# Patient Record
Sex: MALE | Race: WHITE | NOT HISPANIC OR LATINO | Employment: OTHER | URBAN - METROPOLITAN AREA
[De-identification: names, ages, dates, MRNs, and addresses within clinical notes are randomized per-mention and may not be internally consistent; named-entity substitution may affect disease eponyms.]

---

## 2021-05-29 ENCOUNTER — RECORDS - HEALTHEAST (OUTPATIENT)
Dept: ADMINISTRATIVE | Facility: CLINIC | Age: 81
End: 2021-05-29

## 2022-05-04 ENCOUNTER — HOSPITAL ENCOUNTER (EMERGENCY)
Facility: CLINIC | Age: 82
Discharge: LEFT BEFORE DISCHARGE | End: 2022-05-04
Admitting: EMERGENCY MEDICINE

## 2022-05-04 VITALS
WEIGHT: 155 LBS | TEMPERATURE: 96.3 F | OXYGEN SATURATION: 96 % | RESPIRATION RATE: 16 BRPM | SYSTOLIC BLOOD PRESSURE: 139 MMHG | DIASTOLIC BLOOD PRESSURE: 65 MMHG | HEART RATE: 62 BPM

## 2022-05-04 LAB
ATRIAL RATE - MUSE: 59 BPM
DIASTOLIC BLOOD PRESSURE - MUSE: NORMAL MMHG
INTERPRETATION ECG - MUSE: NORMAL
P AXIS - MUSE: 52 DEGREES
PR INTERVAL - MUSE: 162 MS
QRS DURATION - MUSE: 94 MS
QT - MUSE: 396 MS
QTC - MUSE: 392 MS
R AXIS - MUSE: 43 DEGREES
SYSTOLIC BLOOD PRESSURE - MUSE: NORMAL MMHG
T AXIS - MUSE: 55 DEGREES
VENTRICULAR RATE- MUSE: 59 BPM

## 2022-05-04 PROCEDURE — 93005 ELECTROCARDIOGRAM TRACING: CPT | Performed by: EMERGENCY MEDICINE

## 2022-05-04 PROCEDURE — 999N000104 HC STATISTIC NO CHARGE

## 2022-05-04 NOTE — ED TRIAGE NOTES
Patient is here with right jaw pain that does not radiate. He stated that he is unable to open his mouth. No hx of cardiac issues. No other symptoms at this time. He just finished an antibiotic.

## 2023-03-20 ENCOUNTER — APPOINTMENT (OUTPATIENT)
Dept: CT IMAGING | Facility: CLINIC | Age: 83
End: 2023-03-20
Attending: EMERGENCY MEDICINE
Payer: COMMERCIAL

## 2023-03-20 ENCOUNTER — HOSPITAL ENCOUNTER (EMERGENCY)
Facility: CLINIC | Age: 83
Discharge: HOME | End: 2023-03-21
Attending: EMERGENCY MEDICINE | Admitting: EMERGENCY MEDICINE
Payer: COMMERCIAL

## 2023-03-20 DIAGNOSIS — L29.9 ITCHING: ICD-10-CM

## 2023-03-20 DIAGNOSIS — R51.9 ACUTE NONINTRACTABLE HEADACHE, UNSPECIFIED HEADACHE TYPE: ICD-10-CM

## 2023-03-20 PROCEDURE — 250N000011 HC RX IP 250 OP 636: Performed by: EMERGENCY MEDICINE

## 2023-03-20 PROCEDURE — 70450 CT HEAD/BRAIN W/O DYE: CPT

## 2023-03-20 PROCEDURE — 96374 THER/PROPH/DIAG INJ IV PUSH: CPT

## 2023-03-20 PROCEDURE — 96375 TX/PRO/DX INJ NEW DRUG ADDON: CPT

## 2023-03-20 PROCEDURE — 99285 EMERGENCY DEPT VISIT HI MDM: CPT | Mod: 25

## 2023-03-20 PROCEDURE — 250N000013 HC RX MED GY IP 250 OP 250 PS 637: Performed by: EMERGENCY MEDICINE

## 2023-03-20 RX ORDER — IOPAMIDOL 755 MG/ML
100 INJECTION, SOLUTION INTRAVASCULAR ONCE
Status: DISCONTINUED | OUTPATIENT
Start: 2023-03-20 | End: 2023-03-20

## 2023-03-20 RX ORDER — DEXAMETHASONE SODIUM PHOSPHATE 10 MG/ML
10 INJECTION, SOLUTION INTRAMUSCULAR; INTRAVENOUS ONCE
Status: COMPLETED | OUTPATIENT
Start: 2023-03-20 | End: 2023-03-20

## 2023-03-20 RX ORDER — KETOROLAC TROMETHAMINE 15 MG/ML
15 INJECTION, SOLUTION INTRAMUSCULAR; INTRAVENOUS ONCE
Status: COMPLETED | OUTPATIENT
Start: 2023-03-20 | End: 2023-03-20

## 2023-03-20 RX ORDER — HYDROXYZINE HYDROCHLORIDE 25 MG/1
25 TABLET, FILM COATED ORAL EVERY 6 HOURS PRN
Status: DISCONTINUED | OUTPATIENT
Start: 2023-03-20 | End: 2023-03-21 | Stop reason: HOSPADM

## 2023-03-20 RX ADMIN — DEXAMETHASONE SODIUM PHOSPHATE 10 MG: 10 INJECTION, SOLUTION INTRAMUSCULAR; INTRAVENOUS at 22:49

## 2023-03-20 RX ADMIN — KETOROLAC TROMETHAMINE 15 MG: 15 INJECTION, SOLUTION INTRAMUSCULAR; INTRAVENOUS at 22:49

## 2023-03-20 RX ADMIN — HYDROXYZINE HYDROCHLORIDE 25 MG: 25 TABLET ORAL at 22:49

## 2023-03-21 VITALS
WEIGHT: 160 LBS | BODY MASS INDEX: 22.9 KG/M2 | RESPIRATION RATE: 16 BRPM | OXYGEN SATURATION: 96 % | HEART RATE: 58 BPM | TEMPERATURE: 98.7 F | SYSTOLIC BLOOD PRESSURE: 155 MMHG | HEIGHT: 70 IN | DIASTOLIC BLOOD PRESSURE: 67 MMHG

## 2023-03-21 NOTE — ED TRIAGE NOTES
Pt presents to the ED with c/o HA for the past 3 days along with intermittent nausea. Denies emesis or vision/ balance changes. Currently denies any HA. Pt has no pain at the time of triage. EMS gave 4mg ODT zofran.

## 2023-03-21 NOTE — DISCHARGE INSTRUCTIONS
You were seen in the Emergency Department today for a headache and itchiness.      You were given a dose of decadron to hopefully prevent a rebound headache.  The Vistaril should hopefully help with the itchiness and sleep.  The Toradol is similar to Motrin.    I would recommend you continue to use Tylenol and ibuprofen for your headaches.  We also talked about you increasing the dose of your prednisone to see if this might help in case some of your symptoms are related to withdrawal.  You should keep the appointment for an MRI later this week unless the headache goes away before then.      Please return to the ER if you experience worsening headache, numbness/tingling, weakness, inability to keep food/fluids down, and/or for any other new or concerning symptoms, otherwise please follow up with your primary doctor and the dermatologist for recheck.     Below is some information you might find useful.     Thank you for choosing [facility]. It was a pleasure taking care of you today!  - Dr. Susana AMBROCIO. You could also try getting a MyPillow to see if that helps you sleep! (Just kidding)   :)

## 2023-03-21 NOTE — ED PROVIDER NOTES
EMERGENCY DEPARTMENT ENCOUNTER      NAME: Sebastian Teague  YOB: 1940  MRN: [id number]    FINAL IMPRESSION  1. Acute nonintractable headache, unspecified headache type    2. Itching        MEDICAL DECISION MAKING   Pertinent Labs & Imaging studies reviewed. (See chart for details)    Sebastian Teague is a 83 year old male who presents for evaluation of a headache that has been ongoing for the past 3 days.  Patient reports that he has been experiencing recurrent left-sided headache that seems to be worse at night when he lies flat.  He has had difficulty sleeping as a result.  Patient reports that he was seen in urgent care earlier today for evaluation of this complaint and does have an MRI scheduled for later this week for evaluation.  He does not have a history of frequent headaches/migraines.  He has no associated fever, falls, vision change, hearing loss (has chronic tinnitus), focal weakness, numbness, tingling, nausea, or any other new complaints.  Patient does note that he has a diffuse itchy rash over his trunk that he has been dealing with for quite some time.  He has tried multiple topical interventions without much relief.  He notes that he does have an appointment scheduled with a dermatologist.  Additionally, he has a history of polymyalgia rheumatica and has been on steroids for quite some time, just recently cut his dose in half from 5 mg to 2.5 mg.  He notes that his current symptoms did seem to become worse when he did so. Remainder of history and exam, as below.     I reviewed patient's records.  When he was seen in clinic earlier today he had laboratory work-up including CRP and ESR, both of which were reassuring.  With this, I have low suspicion for temporal arteritis/GCA.  BMP was notable for slightly elevated creatinine at 1.2 but otherwise unremarkable.  A CT head was ordered while patient was awaiting evaluation in triage due to boarding crisis and this showed no evidence of  acute intracranial process to explain symptoms.    I considered a broad differential diagnosis including, but not limited to: migraine, tension headache, cluster headache, sinusitis, hypertensive encephalopathy/urgency, referred pain. No falls to suggest traumatic epidural/subdural hematoma. Patient has a non-focal neuro exam so have low suspicion for intracranial process such as CVA, SAH, SDH. There are no fever or infections symptoms to suggest meningitis or encephalitis. The patient also denies vision change or ocular pain and has no exam findings to suggest acute angle-closure glaucoma.  Although patient did have a brief episode of lightheadedness, he has no complaints of vertigo, dizziness, lightheadedness, or ataxia at time of my initial evaluation and has a completely unremarkable neuro exam.  Although he did have pain at home this evening, at time of my initial evaluation, patient reported that his headache had completely resolved without any interventions.    I reviewed results of CT scan with patient and his wife as well as his daughter by phone.  We had a long discussion about potential etiology of symptoms as well as management of his headache and itchiness.  Given temporal relationship between him decreasing his steroid dose and onset of headache as well as worsening of itchy rash, we agreed on plan for him to return to his 5 mg prednisone dose.  Tonight, will plan to give a dose of Decadron in hopes of preventing a rebound headache as well as a dose of Toradol, as patient does not tolerate narcotics well.  Finally, with regards to itchy rash and difficulty sleeping, will give a dose of Vistaril and.  I encouraged patient to keep his appointment with his primary care provider, dermatology, for MRI later this week unless his headache resolves before then.  He and his wife and daughter all felt very comfortable with this plan for disposition.    I rechecked the patient after above interventions.  After  this, he felt eager and ready to go home.  He is ambulatory with a steady gait and without assistance and blood pressure improved with initial interventions.       Strict return precautions and follow up recommendations were discussed and all questions were answered. Patient endorsed understanding and was in agreement with plan.      Medical Decision Making    History:    Supplemental history from: Documented in chart, if applicable    External Record(s) reviewed: Documented in chart, if applicable.    Work Up:    Chart documentation includes differential considered and any EKGs or imaging independently interpreted by provider, where specified.    In additional to work up documented, I considered the following work up: Documented in chart, if applicable.    External consultation:    Discussion of management with another provider: Documented in chart, if applicable    Complicating factors:    Care impacted by chronic illness: Hypertension    Care affected by social determinants of health: N/A    Disposition considerations: Discharge. I recommended the patient continue their current prescription strength medication(s): Prednisone. See documentation for any additional details.          ED COURSE  10:20 PM I met with patient for initial encounter.      MEDICATIONS GIVEN IN THE ED  Medications   hydrOXYzine (ATARAX) tablet 25 mg (25 mg Oral $Given 3/20/23 2249)   dexamethasone PF (DECADRON) injection 10 mg (10 mg Intravenous $Given 3/20/23 2249)   ketorolac (TORADOL) injection 15 mg (15 mg Intravenous $Given 3/20/23 2249)       NEW PRESCRIPTIONS STARTED AT TODAY'S VISIT  There are no discharge medications for this patient.         =================================================================    Chief Complaint   Patient presents with     Headache         HPI:    Patient information was obtained from: Patient    Use of : N/A    Sebastian Teague is a 83 year old male who presents via EMS for evaluation of a  "headache.    Per chart review, patient was seen at [facility] today for a headache. He was diagnosed with an acute intractable headache, and an outpatient MRI was scheduled for further evaluation.    Patient endorses a left sided headache which has been intermittent for the last 3 days, and has currently resolved. He states that the headache usually resolved during the day but returns at night. Patient reports that he has been unable to sleep due to the pain, and is very fatigued. He has been taking OTC pain medications without relief.Today, patient had an episode of nausea while eating and called EMS, who measured his blood pressure at 201 systolic. Patient has been able to eat since then.    Patient denies any trauma to her head. He does have tinnitus at baseline as well as dermatitis which he says also keeps him up at night. No history of migraines. Patient is currently taking prednisone 2.5 mg.    Patient denies vison changes, numbness, weakness, dizziness, light headedness, chest pain, shortness of breath, and all other complaints.               RELEVANT HISTORY, MEDICATIONS, & ALLERGIES   Past medical history, surgical history, family history, medications, and allergies reviewed and pertinent noted in HPI.    REVIEW OF SYSTEMS:  A complete review of systems was performed with pertinent positives and negatives noted in the HPI. All other systems negative.     PHYSICAL EXAM:    Vitals: BP (!) 155/67   Pulse 58   Temp 98.7  F (37.1  C) (Temporal)   Resp 16   Ht 1.778 m (5' 10\")   Wt 72.6 kg (160 lb)   SpO2 96%   BMI 22.96 kg/m     General: Alert and interactive, comfortable appearing.  HENT: Atraumatic. Full AROM of neck. Conjunctiva clear.  No nystagmus.  No tenderness to palpation of scalp or facial bones.  No malocclusion.  No midline tenderness palpation of cervical spine.  No nuchal rigidity.  Cardiovascular: Regular rate and rhythm.   Chest/Pulmonary: Normal work of " breathing. Speaking in complete sentences. Lungs CTAB. No chest wall tenderness or deformities.  Abdomen: Soft, nondistended. Nontender without guarding or rebound.  Extremities: Normal AROM of all major joints.  Skin: Warm and dry. Normal skin color.   Neuro: Speech clear. CNs grossly intact. Moves all extremities spontaneously.  Ambulatory without assistance.  Normal finger-nose-finger bilaterally.  Normal rapid alternating hand movements bilaterally.  No ataxia.  Psych: Normal affect/mood, cooperative, memory appropriate.    RADIOLOGY  Head CT w/o contrast   Final Result   IMPRESSION:   1.  No evidence of acute hemorrhage or other acute intracranial abnormality.   2.  Mild age-related changes.          I, Catalino Villalobos, am serving as a scribe to document services personally performed by Dr. Susana Logan based on my observation and the provider's statements to me. I, Susana Logan MD attest that Catalino Villalobos is acting in a scribe capacity, has observed my performance of the services and has documented them in accordance with my direction.    Susana Logan M.D.  Emergency Medicine  [facility] EMERGENCY ROOM  [id number] [facility] [id number] [phone]  Dept: [phone]      Susana Logan MD  03/21/23 0203